# Patient Record
Sex: MALE | Race: ASIAN | NOT HISPANIC OR LATINO | URBAN - METROPOLITAN AREA
[De-identification: names, ages, dates, MRNs, and addresses within clinical notes are randomized per-mention and may not be internally consistent; named-entity substitution may affect disease eponyms.]

---
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
## 2025-04-21 ENCOUNTER — EMERGENCY (EMERGENCY)
Facility: HOSPITAL | Age: 20
LOS: 1 days | End: 2025-04-21
Admitting: STUDENT IN AN ORGANIZED HEALTH CARE EDUCATION/TRAINING PROGRAM
Payer: COMMERCIAL

## 2025-04-21 VITALS
HEART RATE: 97 BPM | OXYGEN SATURATION: 99 % | SYSTOLIC BLOOD PRESSURE: 150 MMHG | TEMPERATURE: 97 F | RESPIRATION RATE: 18 BRPM | WEIGHT: 162.92 LBS | HEIGHT: 71 IN | DIASTOLIC BLOOD PRESSURE: 103 MMHG

## 2025-04-21 LAB
ALBUMIN SERPL ELPH-MCNC: 5 G/DL — SIGNIFICANT CHANGE UP (ref 3.3–5)
ALP SERPL-CCNC: 80 U/L — SIGNIFICANT CHANGE UP (ref 40–120)
ALT FLD-CCNC: 36 U/L — SIGNIFICANT CHANGE UP (ref 4–41)
ANION GAP SERPL CALC-SCNC: 14 MMOL/L — SIGNIFICANT CHANGE UP (ref 7–14)
APPEARANCE UR: CLEAR — SIGNIFICANT CHANGE UP
AST SERPL-CCNC: 27 U/L — SIGNIFICANT CHANGE UP (ref 4–40)
BACTERIA # UR AUTO: NEGATIVE /HPF — SIGNIFICANT CHANGE UP
BASOPHILS # BLD AUTO: 0.05 K/UL — SIGNIFICANT CHANGE UP (ref 0–0.2)
BASOPHILS NFR BLD AUTO: 0.6 % — SIGNIFICANT CHANGE UP (ref 0–2)
BILIRUB SERPL-MCNC: 1 MG/DL — SIGNIFICANT CHANGE UP (ref 0.2–1.2)
BILIRUB UR-MCNC: NEGATIVE — SIGNIFICANT CHANGE UP
BUN SERPL-MCNC: 19 MG/DL — SIGNIFICANT CHANGE UP (ref 7–23)
CALCIUM SERPL-MCNC: 10.1 MG/DL — SIGNIFICANT CHANGE UP (ref 8.4–10.5)
CAST: 0 /LPF — SIGNIFICANT CHANGE UP (ref 0–4)
CHLORIDE SERPL-SCNC: 101 MMOL/L — SIGNIFICANT CHANGE UP (ref 98–107)
CO2 SERPL-SCNC: 24 MMOL/L — SIGNIFICANT CHANGE UP (ref 22–31)
COLOR SPEC: YELLOW — SIGNIFICANT CHANGE UP
CREAT SERPL-MCNC: 1.08 MG/DL — SIGNIFICANT CHANGE UP (ref 0.5–1.3)
DIFF PNL FLD: NEGATIVE — SIGNIFICANT CHANGE UP
EGFR: 101 ML/MIN/1.73M2 — SIGNIFICANT CHANGE UP
EGFR: 101 ML/MIN/1.73M2 — SIGNIFICANT CHANGE UP
EOSINOPHIL # BLD AUTO: 0.07 K/UL — SIGNIFICANT CHANGE UP (ref 0–0.5)
EOSINOPHIL NFR BLD AUTO: 0.8 % — SIGNIFICANT CHANGE UP (ref 0–6)
GLUCOSE SERPL-MCNC: 106 MG/DL — HIGH (ref 70–99)
GLUCOSE UR QL: NEGATIVE MG/DL — SIGNIFICANT CHANGE UP
HCT VFR BLD CALC: 47.6 % — SIGNIFICANT CHANGE UP (ref 39–50)
HGB BLD-MCNC: 15.7 G/DL — SIGNIFICANT CHANGE UP (ref 13–17)
IANC: 7.11 K/UL — SIGNIFICANT CHANGE UP (ref 1.8–7.4)
IMM GRANULOCYTES NFR BLD AUTO: 0.2 % — SIGNIFICANT CHANGE UP (ref 0–0.9)
KETONES UR-MCNC: 15 MG/DL
LEUKOCYTE ESTERASE UR-ACNC: ABNORMAL
LIDOCAIN IGE QN: 21 U/L — SIGNIFICANT CHANGE UP (ref 7–60)
LYMPHOCYTES # BLD AUTO: 1.37 K/UL — SIGNIFICANT CHANGE UP (ref 1–3.3)
LYMPHOCYTES # BLD AUTO: 15.1 % — SIGNIFICANT CHANGE UP (ref 13–44)
MCHC RBC-ENTMCNC: 27.5 PG — SIGNIFICANT CHANGE UP (ref 27–34)
MCHC RBC-ENTMCNC: 33 G/DL — SIGNIFICANT CHANGE UP (ref 32–36)
MCV RBC AUTO: 83.5 FL — SIGNIFICANT CHANGE UP (ref 80–100)
MONOCYTES # BLD AUTO: 0.46 K/UL — SIGNIFICANT CHANGE UP (ref 0–0.9)
MONOCYTES NFR BLD AUTO: 5.1 % — SIGNIFICANT CHANGE UP (ref 2–14)
NEUTROPHILS # BLD AUTO: 7.11 K/UL — SIGNIFICANT CHANGE UP (ref 1.8–7.4)
NEUTROPHILS NFR BLD AUTO: 78.2 % — HIGH (ref 43–77)
NITRITE UR-MCNC: NEGATIVE — SIGNIFICANT CHANGE UP
NRBC # BLD AUTO: 0 K/UL — SIGNIFICANT CHANGE UP (ref 0–0)
NRBC # FLD: 0 K/UL — SIGNIFICANT CHANGE UP (ref 0–0)
NRBC BLD AUTO-RTO: 0 /100 WBCS — SIGNIFICANT CHANGE UP (ref 0–0)
PH UR: 6 — SIGNIFICANT CHANGE UP (ref 5–8)
PLATELET # BLD AUTO: 278 K/UL — SIGNIFICANT CHANGE UP (ref 150–400)
POTASSIUM SERPL-MCNC: 3.9 MMOL/L — SIGNIFICANT CHANGE UP (ref 3.5–5.3)
POTASSIUM SERPL-SCNC: 3.9 MMOL/L — SIGNIFICANT CHANGE UP (ref 3.5–5.3)
PROT SERPL-MCNC: 8.6 G/DL — HIGH (ref 6–8.3)
PROT UR-MCNC: SIGNIFICANT CHANGE UP MG/DL
RBC # BLD: 5.7 M/UL — SIGNIFICANT CHANGE UP (ref 4.2–5.8)
RBC # FLD: 13.2 % — SIGNIFICANT CHANGE UP (ref 10.3–14.5)
RBC CASTS # UR COMP ASSIST: 1 /HPF — SIGNIFICANT CHANGE UP (ref 0–4)
REVIEW: SIGNIFICANT CHANGE UP
SODIUM SERPL-SCNC: 139 MMOL/L — SIGNIFICANT CHANGE UP (ref 135–145)
SP GR SPEC: 1.03 — HIGH (ref 1–1.03)
SQUAMOUS # UR AUTO: 0 /HPF — SIGNIFICANT CHANGE UP (ref 0–5)
UROBILINOGEN FLD QL: 1 MG/DL — SIGNIFICANT CHANGE UP (ref 0.2–1)
WBC # BLD: 9.08 K/UL — SIGNIFICANT CHANGE UP (ref 3.8–10.5)
WBC # FLD AUTO: 9.08 K/UL — SIGNIFICANT CHANGE UP (ref 3.8–10.5)
WBC UR QL: 1 /HPF — SIGNIFICANT CHANGE UP (ref 0–5)

## 2025-04-21 PROCEDURE — 99284 EMERGENCY DEPT VISIT MOD MDM: CPT

## 2025-04-21 PROCEDURE — 93010 ELECTROCARDIOGRAM REPORT: CPT

## 2025-04-21 RX ORDER — ONDANSETRON HCL/PF 4 MG/2 ML
4 VIAL (ML) INJECTION ONCE
Refills: 0 | Status: COMPLETED | OUTPATIENT
Start: 2025-04-21 | End: 2025-04-21

## 2025-04-21 RX ADMIN — Medication 4 MILLIGRAM(S): at 14:04

## 2025-04-21 NOTE — ED ADULT NURSE NOTE - OBJECTIVE STATEMENT
pt seeking "mental help" and was told to come by urgent care. pt states mom and sister are sick at home with covid. He is nervous he is sick as well. pt states n/v x 4 days with headache. Denies SI/HI, auditory, or visual hallucination. pt states "I keep thinking about the bad things in the past".  Pt past hx of anxiety.  Patient brought to  area for above complaints. Patient is feeling a lot of guilt from behavior he had concerning his girlfriend cheating and him cheating in return. Patient is very anxious and tearful. Patient evaluated by medical provider. EKG done . Patient waiting for further orders and disposition.   Abhishek Brown

## 2025-04-21 NOTE — ED PROVIDER NOTE - PATIENT PORTAL LINK FT
You can access the FollowMyHealth Patient Portal offered by [facility] by registering at the following website: [email or web address]. By joining [employer]’s FollowMyHealth portal, you will also be able to view your health information using other applications (apps) compatible with our system.

## 2025-04-21 NOTE — ED ADULT TRIAGE NOTE - CHIEF COMPLAINT QUOTE
pt seeking "mental help" and was told to come by urgent care. pt states mom and sister are sick at home with covid. He is nervous he is sick as well. pt states n/v x 4 days with headache. Denies SI/HI, auditory, or visual hallucination. pt states "I keep thinking about the bad things in the past".  Pt past hx of anxiety.

## 2025-04-21 NOTE — ED PROVIDER NOTE - OBJECTIVE STATEMENT
This is a 20-year-old male past medical history of asthma and anxiety with complaint of increased anxiety patient reports broke up where his girlfriend's friend broke up with him 5 days ago and Saturday he had an episode while he regrets and feels guilty and a lots of shame about This is a 20-year-old male past medical history of asthma and anxiety with complaint of increased anxiety patient reports broke up where his girlfriend's friend broke up with him 5 days ago and Saturday he had an episode while he regrets and feels guilty and a lots of shame about.

## 2025-04-21 NOTE — ED PROVIDER NOTE - PROGRESS NOTE DETAILS
NP Guilherme- collateral info by sw, refer to the note,   labs unremarkable, PT anxiety improved, resources to [facility] given.

## 2025-04-21 NOTE — ED BEHAVIORAL HEALTH NOTE - BEHAVIORAL HEALTH NOTE
Worker met with patient at bedside who states that he is having increased anxiety. He states that a couple days ago he thought his ex- girlfriend who he had an off and on relationship was cheating on him. He states he made a poor choice and slept with a hooker. He states that he has a lot of guilt and shame around this and it is making his anxiety increased and he keeps thinking about this mistake. He states that his parents have depression. He states that he would like to have std testing. Worker spoke about linking him to a therapist to help with anxiety. Worker spoke to patient about coping mechanisms to help with his anxiety and ways to work around this stressor. Worker provided [facility] walk in information for patient and encouraged him to walk in. He declined the appointment to be made via site due to his school schedule.

## 2025-04-21 NOTE — ED ADULT NURSE NOTE - NS ED NURSE RECORD ANOTHER VITAL SIGN
Patient AOX1. VSS on RA. Pt blood sugar 55 this morning; D5 IVP given; blood sugar 111 afterwards. Dr. Green notified; started on IVF for the morning.   Pt having right wrist pain; notified Dr. Green; XR ordered and one time PRN morphine IVP given.     No additional events. Plan for discharge tomorrow morning with [facility]. BLS set up for 10:15.    Yes

## 2025-04-21 NOTE — ED BEHAVIORAL HEALTH NOTE - BEHAVIORAL HEALTH NOTE
As per provider, worker called patient’s sister ([phone]) Teri for collateral information. All information is as per sister:    The patient lives with his sister and parents. Last night, he experienced vomiting and a headache. He suffers from anxiety, though a formal diagnosis is uncertain. He is coping with a recent breakup after a two-year relationship.  Pt recently broke up with girlfriend a week ago. She denies auditory/visual hallucinations.  She states that the patient is not psychotic. His eating and sleeping patterns are within his usual baseline. She denies suicidal ideation and  sister has been present with him.  No self-harm and does not have access to firearms. He reportedly uses marijuana once or twice a week, works part-time, and attends [employer]. He has not exhibited any violent or aggressive behaviors. No safety concerns for patient being discharge. As per provider, worker called patient’s sister ([phone]) Teri for collateral information. All information is as per sister:    The patient lives with his sister and parents. Last night, he experienced vomiting and a headache. He suffers from anxiety, though a formal diagnosis is uncertain. He is coping with a recent breakup after a two-year relationship.  Pt recently broke up with girlfriend a week ago. She denies auditory/visual hallucinations.  She states that the patient is not psychotic. His eating and sleeping patterns are within his usual baseline. She denies suicidal ideation and  sister has been present with him.  No self-harm and does not have access to firearms. He reportedly uses marijuana once or twice a week, works part-time, and attends [employer]. He has not exhibited any violent or aggressive behaviors. No safety concerns for patient being discharge.    psykes:  :  2005 (20 Yrs)   Address:  [address] [place] [address]   Medicaid ID:  [id number] Medicare:  No   Managed Care Plan:  No Managed Care(FFS Only)   MC Plan Assigned PCP :  N/A   Children's Waiver Status:  N/A  [facility] HCBS Assessment Status:  N/A   Medicaid Eligibility Expires on:    Notifications  Limited DataThis individual has less than 1 year of Medicaid eligibility and therefore has limited data available in their Clinical Summary  Diagnoses Past Year  Behavioral Health  No Medicaid claims for this data type in the past year  Medical  No Medicaid claims for this data type in the past year  Medications Past Year  No Medicaid claims for this data type in the past year  Outpatient Providers Past Year  No Medicaid claims for this data type in the past year  All Hospital and Crisis Utilization • 5 Years  ER Visits# ProvidersLast ER Visit  No Medicaid claims for this data type in the past 5 years  Inpatient Admissions# ProvidersLast Inpatient Admission  No Medicaid claims for this data type in the past 5 years  Crisis Services# ProvidersLast Crisis Service  No Medicaid claims for this data type in the past 5 years  Brief Overview as of 4/15/2025

## 2025-04-21 NOTE — ED PROVIDER NOTE - CLINICAL SUMMARY MEDICAL DECISION MAKING FREE TEXT BOX
This is a 20-year-old male past medical history of asthma and anxiety with complaint of increased anxiety patient reports broke up where his girlfriend's friend broke up with him 5 days ago and Saturday he had an episode while he regrets and feels guilty and a lots of shame about.

## 2025-04-21 NOTE — ED PROVIDER NOTE - NSFOLLOWUPINSTRUCTIONS_ED_ALL_ED_FT
You have been given information necessary to follow up with the  [facility] ([facility]) Crisis center & other outpatient  psychiatric clinics within your community    • [facility] walk in Crisis centre  [address] [place] [address] [phone] [email or web address]  Hours of operation:  Day	                                        Hours  Sunday                                  Closed  Monday                                9am - 3pm  Tuesday                                9am - 3pm  Wednesday                          9am - 3pm  Thursday                               9am - 3pm  Friday                                    9am - 3pm  Saturday                                Closed    .....additionally if your current problem is associated with drug or alcohol abuse further information can be obtained at the Drug Abuse Evaluation Health Referral Servce ([facility])    • [facility] clinic [address] [place] [address] [phone] [email or web address]    Additionally if more support and information and help is needed in the area of suicide prevention pleas3 feel free to contact :   • [place] [address]  Phone: 3-890-830-JAGV (5656)  Web Address: http://www.suicidepreventionlifeline.org  • Suicide Awareness Voices of Education  [address] [place] [place], [place] [id number]  Phone: 1-485.101.2312  Web Address: http://www.save.org    Anxiety    WHAT YOU NEED TO KNOW:    Anxiety is a condition that causes you to feel extremely worried or nervous. The feelings are so strong that they can cause problems with your daily activities or sleep. Anxiety may be triggered by something you fear, or it may happen without a cause. Family or work stress, smoking, caffeine, and alcohol can increase your risk for anxiety. Certain medicines or health conditions can also increase your risk. Anxiety can become a long-term condition if it is not managed or treated.    DISCHARGE INSTRUCTIONS:    Call your local emergency number (911 in the [place]) if:    You have chest pain, tightness, or heaviness that may spread to your shoulders, arms, jaw, neck, or back.    You feel like hurting yourself or someone else.  Call your doctor if:    Your symptoms get worse or do not get better with treatment.    Your anxiety keeps you from doing your regular daily activities.    You have new symptoms since your last visit.    You have questions or concerns about your condition or care.  Medicines:    Medicines may be given to help you feel more calm and relaxed, and decrease your symptoms.    Take your medicine as directed. Contact your healthcare provider if you think your medicine is not helping or if you have side effects. Tell him of her if you are allergic to any medicine. Keep a list of the medicines, vitamins, and herbs you take. Include the amounts, and when and why you take them. Bring the list or the pill bottles to follow-up visits. Carry your medicine list with you in case of an emergency.  Manage anxiety:    Talk to someone about your anxiety. Your healthcare provider may suggest counseling. Cognitive behavioral therapy can help you understand and change how you react to events that trigger your symptoms. You might feel more comfortable talking with a friend or family member about your anxiety. Choose someone you know will be supportive and encouraging.    Find ways to relax. Activities such as exercise, meditation, or listening to music can help you relax. Spend time with friends, or do things you enjoy.    Practice deep breathing. Deep breathing can help you relax when you feel anxious. Focus on taking slow, deep breaths several times a day, or during an anxiety attack. Breathe in through your nose and out through your mouth.    Create a regular sleep routine. Regular sleep can help you feel calmer during the day. Go to sleep and wake up at the same times every day. Do not watch television or use the computer right before bed. Your room should be comfortable, dark, and quiet.    Eat a variety of healthy foods. Healthy foods include fruits, vegetables, low-fat dairy products, lean meats, fish, whole-grain breads, and cooked beans. Healthy foods can help you feel less anxious and have more energy.  Healthy Foods      Exercise regularly. Exercise can increase your energy level. Exercise may also lift your mood and help you sleep better. Your healthcare provider can help you create an exercise plan.  Walking for Exercise      Do not smoke. Nicotine and other chemicals in cigarettes and cigars can increase anxiety. Ask your healthcare provider for information if you currently smoke and need help to quit. E-cigarettes or smokeless tobacco still contain nicotine. Talk to your healthcare provider before you use these products.    Do not have caffeine. Caffeine can make your symptoms worse. Do not have foods or drinks that are meant to increase your energy level.    Limit or do not drink alcohol. Ask your healthcare provider if alcohol is safe for you. You may not be able to drink alcohol if you take certain anxiety or depression medicines. Limit alcohol to 1 drink per day if you are a woman. Limit alcohol to 2 drinks per day if you are a man. A drink of alcohol is 12 ounces of beer, 5 ounces of wine, or 1½ ounces of liquor.    Do not use drugs. Drugs can make your anxiety worse. It can also make anxiety hard to manage. Talk to your healthcare provider if you use drugs and want help to quit.  Follow up with your doctor within 2 weeks or as directed: Write down your questions so you remember to ask them during your visits.

## 2025-04-22 LAB
C TRACH RRNA SPEC QL NAA+PROBE: SIGNIFICANT CHANGE UP
N GONORRHOEA RRNA SPEC QL NAA+PROBE: SIGNIFICANT CHANGE UP
SPECIMEN SOURCE: SIGNIFICANT CHANGE UP
T PALLIDUM AB TITR SER: NEGATIVE — SIGNIFICANT CHANGE UP

## 2025-05-20 ENCOUNTER — NON-APPOINTMENT (OUTPATIENT)
Age: 20
End: 2025-05-20

## 2025-07-14 ENCOUNTER — NON-APPOINTMENT (OUTPATIENT)
Age: 20
End: 2025-07-14